# Patient Record
Sex: MALE | Race: WHITE
[De-identification: names, ages, dates, MRNs, and addresses within clinical notes are randomized per-mention and may not be internally consistent; named-entity substitution may affect disease eponyms.]

---

## 2017-03-23 ENCOUNTER — HOSPITAL ENCOUNTER (EMERGENCY)
Dept: HOSPITAL 79 - ER1 | Age: 67
LOS: 1 days | Discharge: HOME | End: 2017-03-24
Payer: MEDICARE

## 2017-03-23 DIAGNOSIS — Z88.8: ICD-10-CM

## 2017-03-23 DIAGNOSIS — Z88.5: ICD-10-CM

## 2017-03-23 DIAGNOSIS — R05: ICD-10-CM

## 2017-03-23 DIAGNOSIS — Z90.49: ICD-10-CM

## 2017-03-23 DIAGNOSIS — Z88.2: ICD-10-CM

## 2017-03-23 DIAGNOSIS — E78.5: ICD-10-CM

## 2017-03-23 DIAGNOSIS — I25.10: ICD-10-CM

## 2017-03-23 DIAGNOSIS — Z95.5: ICD-10-CM

## 2017-03-23 DIAGNOSIS — I10: Primary | ICD-10-CM

## 2017-03-23 DIAGNOSIS — R06.02: ICD-10-CM

## 2017-03-24 LAB
BUN/CREATININE RATIO: 21 (ref 0–10)
HGB BLD-MCNC: 13.7 GM/DL (ref 14–17.5)
RED BLOOD COUNT: 4.51 M/UL (ref 4.2–5.5)
WHITE BLOOD COUNT: 6.5 K/UL (ref 4.5–11)

## 2021-01-22 ENCOUNTER — HOSPITAL ENCOUNTER (INPATIENT)
Dept: HOSPITAL 79 - ER1 | Age: 71
LOS: 7 days | Discharge: HOME | DRG: 177 | End: 2021-01-29
Attending: INTERNAL MEDICINE | Admitting: HOSPITALIST
Payer: MEDICARE

## 2021-01-22 VITALS — BODY MASS INDEX: 36.37 KG/M2 | WEIGHT: 240 LBS | HEIGHT: 68 IN

## 2021-01-22 DIAGNOSIS — E80.7: ICD-10-CM

## 2021-01-22 DIAGNOSIS — R00.0: ICD-10-CM

## 2021-01-22 DIAGNOSIS — E66.01: ICD-10-CM

## 2021-01-22 DIAGNOSIS — K21.9: ICD-10-CM

## 2021-01-22 DIAGNOSIS — Z90.49: ICD-10-CM

## 2021-01-22 DIAGNOSIS — J12.82: ICD-10-CM

## 2021-01-22 DIAGNOSIS — R10.13: ICD-10-CM

## 2021-01-22 DIAGNOSIS — E87.6: ICD-10-CM

## 2021-01-22 DIAGNOSIS — Z99.81: ICD-10-CM

## 2021-01-22 DIAGNOSIS — D64.9: ICD-10-CM

## 2021-01-22 DIAGNOSIS — Z79.01: ICD-10-CM

## 2021-01-22 DIAGNOSIS — Z88.8: ICD-10-CM

## 2021-01-22 DIAGNOSIS — K76.0: ICD-10-CM

## 2021-01-22 DIAGNOSIS — N28.1: ICD-10-CM

## 2021-01-22 DIAGNOSIS — E78.5: ICD-10-CM

## 2021-01-22 DIAGNOSIS — E80.6: ICD-10-CM

## 2021-01-22 DIAGNOSIS — J96.01: ICD-10-CM

## 2021-01-22 DIAGNOSIS — U07.1: Primary | ICD-10-CM

## 2021-01-22 DIAGNOSIS — K27.3: ICD-10-CM

## 2021-01-22 DIAGNOSIS — I10: ICD-10-CM

## 2021-01-22 LAB
BUN/CREATININE RATIO: 15 (ref 0–10)
HGB BLD-MCNC: 12.7 GM/DL (ref 14–17.5)
RED BLOOD COUNT: 4.28 M/UL (ref 4.2–5.5)
WHITE BLOOD COUNT: 8 K/UL (ref 4.5–11)

## 2021-01-22 PROCEDURE — C9113 INJ PANTOPRAZOLE SODIUM, VIA: HCPCS

## 2021-01-22 PROCEDURE — XW033E5 INTRODUCTION OF REMDESIVIR ANTI-INFECTIVE INTO PERIPHERAL VEIN, PERCUTANEOUS APPROACH, NEW TECHNOLOGY GROUP 5: ICD-10-PCS | Performed by: HOSPITALIST

## 2021-01-22 PROCEDURE — U0002 COVID-19 LAB TEST NON-CDC: HCPCS

## 2021-01-22 PROCEDURE — 8E0ZXY6 ISOLATION: ICD-10-PCS | Performed by: HOSPITALIST

## 2021-01-23 LAB
BUN/CREATININE RATIO: 19 (ref 0–10)
HGB BLD-MCNC: 12.5 GM/DL (ref 14–17.5)
RED BLOOD COUNT: 4.32 M/UL (ref 4.2–5.5)
WHITE BLOOD COUNT: 6.2 K/UL (ref 4.5–11)

## 2021-01-23 NOTE — NUR
[facility] PATIENT WAS IN NO
DISTRESS ON OXYGEN. PATIENT WAS PUT ON VITALS MONITOR AND VITALS WERE NOTED TO
BE STABLE. PATIENT GIVEN CALL LIGHT INSTRUCTION.

## 2021-01-26 LAB
BUN/CREATININE RATIO: 22 (ref 0–10)
HGB BLD-MCNC: 11.9 GM/DL (ref 14–17.5)
RED BLOOD COUNT: 4.08 M/UL (ref 4.2–5.5)
WHITE BLOOD COUNT: 10.8 K/UL (ref 4.5–11)

## 2021-01-28 LAB
BUN/CREATININE RATIO: 23 (ref 0–10)
HGB BLD-MCNC: 11.8 GM/DL (ref 14–17.5)
RED BLOOD COUNT: 4.03 M/UL (ref 4.2–5.5)
WHITE BLOOD COUNT: 15 K/UL (ref 4.5–11)

## 2021-01-29 LAB
BUN/CREATININE RATIO: 23 (ref 0–10)
HGB BLD-MCNC: 11.4 GM/DL (ref 14–17.5)
RED BLOOD COUNT: 4.07 M/UL (ref 4.2–5.5)
WHITE BLOOD COUNT: 11.1 K/UL (ref 4.5–11)

## 2021-03-18 ENCOUNTER — HOSPITAL ENCOUNTER (EMERGENCY)
Dept: HOSPITAL 79 - ER1 | Age: 71
Discharge: HOME | End: 2021-03-18
Payer: MEDICARE

## 2021-03-18 DIAGNOSIS — B34.9: Primary | ICD-10-CM

## 2021-03-18 DIAGNOSIS — Z90.89: ICD-10-CM

## 2021-03-18 DIAGNOSIS — I10: ICD-10-CM

## 2021-03-18 DIAGNOSIS — Z88.2: ICD-10-CM

## 2021-03-18 LAB
BUN/CREATININE RATIO: 21 (ref 0–10)
HGB BLD-MCNC: 12.3 GM/DL (ref 14–17.5)
RED BLOOD COUNT: 4.31 M/UL (ref 4.2–5.5)
WHITE BLOOD COUNT: 6 K/UL (ref 4.5–11)

## 2021-07-28 ENCOUNTER — HOSPITAL ENCOUNTER (EMERGENCY)
Dept: HOSPITAL 79 - ER1 | Age: 71
Discharge: HOME | End: 2021-07-28
Payer: MEDICARE

## 2021-07-28 DIAGNOSIS — Z90.89: ICD-10-CM

## 2021-07-28 DIAGNOSIS — I25.10: ICD-10-CM

## 2021-07-28 DIAGNOSIS — Z90.49: ICD-10-CM

## 2021-07-28 DIAGNOSIS — I10: ICD-10-CM

## 2021-07-28 DIAGNOSIS — E87.6: Primary | ICD-10-CM

## 2021-07-28 DIAGNOSIS — Z20.822: ICD-10-CM

## 2021-07-28 LAB
BUN/CREATININE RATIO: 14 (ref 0–10)
HGB BLD-MCNC: 12.2 GM/DL (ref 14–17.5)
RED BLOOD COUNT: 4.06 M/UL (ref 4.2–5.5)
WHITE BLOOD COUNT: 7.6 K/UL (ref 4.5–11)

## 2022-07-15 ENCOUNTER — HOSPITAL ENCOUNTER (OUTPATIENT)
Dept: HOSPITAL 79 - ER1 | Age: 72
Setting detail: OBSERVATION
LOS: 2 days | Discharge: HOME | End: 2022-07-17
Attending: INTERNAL MEDICINE | Admitting: INTERNAL MEDICINE
Payer: MEDICARE

## 2022-07-15 VITALS — WEIGHT: 230 LBS | BODY MASS INDEX: 34.86 KG/M2 | HEIGHT: 68 IN

## 2022-07-15 DIAGNOSIS — E80.6: ICD-10-CM

## 2022-07-15 DIAGNOSIS — F41.9: ICD-10-CM

## 2022-07-15 DIAGNOSIS — E78.5: ICD-10-CM

## 2022-07-15 DIAGNOSIS — Z53.29: ICD-10-CM

## 2022-07-15 DIAGNOSIS — D64.9: ICD-10-CM

## 2022-07-15 DIAGNOSIS — I10: ICD-10-CM

## 2022-07-15 DIAGNOSIS — Z79.899: ICD-10-CM

## 2022-07-15 DIAGNOSIS — E66.9: ICD-10-CM

## 2022-07-15 DIAGNOSIS — Z88.5: ICD-10-CM

## 2022-07-15 DIAGNOSIS — I25.10: ICD-10-CM

## 2022-07-15 DIAGNOSIS — D69.6: ICD-10-CM

## 2022-07-15 DIAGNOSIS — Z95.5: ICD-10-CM

## 2022-07-15 DIAGNOSIS — I48.91: ICD-10-CM

## 2022-07-15 DIAGNOSIS — Z88.8: ICD-10-CM

## 2022-07-15 DIAGNOSIS — Z88.2: ICD-10-CM

## 2022-07-15 DIAGNOSIS — R07.89: Primary | ICD-10-CM

## 2022-07-15 DIAGNOSIS — Z88.1: ICD-10-CM

## 2022-07-15 DIAGNOSIS — Z79.02: ICD-10-CM

## 2022-07-15 LAB
BUN/CREATININE RATIO: 18 (ref 0–10)
HGB BLD-MCNC: 13.9 GM/DL (ref 14–17.5)
RED BLOOD COUNT: 4.66 M/UL (ref 4.2–5.5)
WHITE BLOOD COUNT: 7.6 K/UL (ref 4.5–11)

## 2022-07-15 PROCEDURE — G0378 HOSPITAL OBSERVATION PER HR: HCPCS

## 2022-07-15 NOTE — NUR
PATIENT COMPLAINING ABOUT PAIN TODAY CALLED DR JAMESON WHO GAVE ORDER OF
PERCOCET PATIENT SAID HE WOULD TRY THIS AND IF IT DIDNT WORK THEN HE WAS GOING
HOME BECAUSE THE MORPHINE WAS NOT HELPING AND HE DID NOT THINK IT WAS MUSCLE
RELATED LIKE DR JAMESON.

## 2022-07-16 LAB
BUN/CREATININE RATIO: 21 (ref 0–10)
HGB BLD-MCNC: 13.3 GM/DL (ref 14–17.5)
RED BLOOD COUNT: 4.44 M/UL (ref 4.2–5.5)
WHITE BLOOD COUNT: 9.3 K/UL (ref 4.5–11)

## 2022-07-17 NOTE — NUR
PATIENT INFORMED WE WOULD BE BRINGING A WHEELCHAIR, BUT INSISTED ON WALKING
HIMSELF. I WAS NOT ABLE TO CONVINCE HIM TO WAIT. INFORMED HIM OF THE RISKS BUT
HE RELIED HE DIDNT CARE. PATIENT LEFT ROOM AND WENT WALKING DOWN HALLWAY TO
LEAVE.

## 2022-07-17 NOTE — NUR
PATIENT COMPLAINS THAT WE ARE NOT HELPING HIM AND HE WANTS TO LEAVE AMA. I
EXPLAINED TO HIM THAT THATWOULD NOT BE A GOOD IDEA WITH THE PROBLEMS HE HAS
HAD. PATIENT STILL HAVING EPISODES OF SINUS TACK. NO DISTRESS NOTED OTHER THAN
THE HIGH HEART RATE AND HIM BEING MAD AT US. DR TOLEDO INFORMED OF PATIENTS
CONDITION AND REQUESTS.